# Patient Record
Sex: FEMALE | Race: WHITE | NOT HISPANIC OR LATINO | Employment: FULL TIME | URBAN - METROPOLITAN AREA
[De-identification: names, ages, dates, MRNs, and addresses within clinical notes are randomized per-mention and may not be internally consistent; named-entity substitution may affect disease eponyms.]

---

## 2023-07-24 ENCOUNTER — TELEPHONE (OUTPATIENT)
Dept: NEUROSURGERY | Facility: CLINIC | Age: 33
End: 2023-07-24
Payer: OTHER GOVERNMENT

## 2023-07-24 NOTE — TELEPHONE ENCOUNTER
----- Message from Johanna Winn sent at 7/24/2023  3:20 PM CDT -----  Regarding: REFERRAL  Contact: JUVENCIO IBARRA [phone]    Type: Needs Medical Advice      Who Called:  JUVENCIO IBARRA     Best Call Back Number: [phone]       Additional Information: Patient is calling to speak with nurse/MA regarding a referral.   Please call back and advise. Thanks

## 2024-04-02 ENCOUNTER — OFFICE VISIT (OUTPATIENT)
Dept: URGENT CARE | Facility: CLINIC | Age: 34
End: 2024-04-02
Payer: OTHER GOVERNMENT

## 2024-04-02 VITALS
DIASTOLIC BLOOD PRESSURE: 75 MMHG | TEMPERATURE: 98 F | SYSTOLIC BLOOD PRESSURE: 125 MMHG | WEIGHT: 181 LBS | BODY MASS INDEX: 25.91 KG/M2 | HEART RATE: 54 BPM | OXYGEN SATURATION: 100 % | RESPIRATION RATE: 16 BRPM | HEIGHT: 70 IN

## 2024-04-02 DIAGNOSIS — J02.9 SORE THROAT: ICD-10-CM

## 2024-04-02 DIAGNOSIS — J02.0 STREP PHARYNGITIS: Primary | ICD-10-CM

## 2024-04-02 LAB
CTP QC/QA: YES
S PYO RRNA THROAT QL PROBE: POSITIVE

## 2024-04-02 PROCEDURE — 87880 STREP A ASSAY W/OPTIC: CPT | Mod: QW,,,

## 2024-04-02 PROCEDURE — 99204 OFFICE O/P NEW MOD 45 MIN: CPT | Mod: S$GLB,,,

## 2024-04-02 RX ORDER — AMOXICILLIN 500 MG/1
500 TABLET, FILM COATED ORAL EVERY 12 HOURS
Qty: 20 TABLET | Refills: 0 | Status: SHIPPED | OUTPATIENT
Start: 2024-04-02 | End: 2024-04-12

## 2024-04-02 NOTE — LETTER
April 2, 2024      [facility]  [address]  [facility] [address]  Phone: [phone]       Patient: Denis William   YOB: 1990  Date of Visit: 04/02/2024    To Whom It May Concern:    Destini William  was at [facility] on 04/02/2024. The patient may return to work/school on 4/4/24 with no restrictions. If you have any questions or concerns, or if I can be of further assistance, please do not hesitate to contact me.    Sincerely,    TOMY Paul

## 2024-04-02 NOTE — PROGRESS NOTES
"Subjective:      Patient ID: Denis William is a 33 y.o. female.    Vitals:  height is 5' 10" (1.778 m) and weight is 82.1 kg (181 lb). Her temperature is 98.2 °F (36.8 °C). Her blood pressure is 125/75 and her pulse is 54 (abnormal). Her respiration is 16 and oxygen saturation is 100%.     Chief Complaint: Sore Throat    Patient states he woke up this am with sore throat and body aches. Pain level 7/10    Sore Throat         HENT:  Positive for sore throat.       Objective:     Physical Exam    Assessment:     1. Sore throat        Plan:       Sore throat  -     POCT rapid strep A                    "

## 2024-04-02 NOTE — PROGRESS NOTES
"Subjective:      Patient ID: Denis William is a 33 y.o. female.    Vitals:  height is 5' 10" (1.778 m) and weight is 82.1 kg (181 lb). Her temperature is 98.2 °F (36.8 °C). Her blood pressure is 125/75 and her pulse is 54 (abnormal). Her respiration is 16 and oxygen saturation is 100%.     Chief Complaint: Sore Throat    In clinic with a chief complaint of a sore throat that began this morning.  Has also been sneezing, and having viral URI symptoms.  No fever.  Positive for sick exposure at work.  No ill contacts within the household.  No medications for symptom control.    Sore Throat   This is a new problem. The current episode started today. The problem has been unchanged. There has been no fever. Associated symptoms include coughing and a hoarse voice. Pertinent negatives include no congestion, diarrhea, swollen glands, trouble swallowing or vomiting. She has had no exposure to strep. She has tried nothing for the symptoms.       Constitution: Positive for fatigue. Negative for fever.   HENT:  Positive for sore throat. Negative for congestion, postnasal drip and trouble swallowing.    Neck: Negative for painful lymph nodes.   Cardiovascular: Negative.    Eyes: Negative.    Respiratory:  Positive for cough. Negative for sputum production.    Gastrointestinal:  Negative for nausea, vomiting and diarrhea.   Endocrine: negative.   Genitourinary: Negative.    Musculoskeletal:  Positive for muscle ache.   Skin: Negative.    Allergic/Immunologic: Positive for environmental allergies, seasonal allergies, itching, sneezing and immunizations up-to-date.   Neurological: Negative.    Hematologic/Lymphatic: Negative for swollen lymph nodes.   Psychiatric/Behavioral: Negative.        Objective:     Physical Exam   Constitutional: She is oriented to person, place, and time. She appears well-developed. She is cooperative.   HENT:   Head: Normocephalic and atraumatic.   Ears:   Right Ear: Hearing, tympanic membrane, external " ear and ear canal normal.   Left Ear: Hearing, tympanic membrane, external ear and ear canal normal.   Nose: Nose normal. No mucosal edema or nasal deformity. No epistaxis. Right sinus exhibits no maxillary sinus tenderness and no frontal sinus tenderness. Left sinus exhibits no maxillary sinus tenderness and no frontal sinus tenderness.   Mouth/Throat: Uvula is midline and mucous membranes are normal. Mucous membranes are moist. No trismus in the jaw. Normal dentition. No uvula swelling. Posterior oropharyngeal erythema present. No oropharyngeal exudate. Oropharynx is clear.   Eyes: Conjunctivae and lids are normal. Pupils are equal, round, and reactive to light. Extraocular movement intact   Neck: Trachea normal and phonation normal. Neck supple.   Cardiovascular: Normal rate, regular rhythm, normal heart sounds and normal pulses.   Pulmonary/Chest: Effort normal and breath sounds normal.   Abdominal: Normal appearance.   Musculoskeletal: Normal range of motion.         General: Normal range of motion.   Lymphadenopathy:     She has cervical adenopathy.   Neurological: She is alert and oriented to person, place, and time. She exhibits normal muscle tone.   Skin: Skin is warm, dry and intact.   Psychiatric: Her speech is normal and behavior is normal. Judgment and thought content normal.   Nursing note and vitals reviewed.      Assessment:     1. Strep pharyngitis    2. Sore throat        Plan:       Strep pharyngitis    Sore throat  -     POCT rapid strep A    Other orders  -     amoxicillin (AMOXIL) 500 MG Tab; Take 1 tablet (500 mg total) by mouth every 12 (twelve) hours. for 10 days  Dispense: 20 tablet; Refill: 0